# Patient Record
Sex: MALE | Race: BLACK OR AFRICAN AMERICAN | NOT HISPANIC OR LATINO | Employment: FULL TIME | URBAN - METROPOLITAN AREA
[De-identification: names, ages, dates, MRNs, and addresses within clinical notes are randomized per-mention and may not be internally consistent; named-entity substitution may affect disease eponyms.]

---

## 2019-04-11 ENCOUNTER — HOSPITAL ENCOUNTER (EMERGENCY)
Facility: HOSPITAL | Age: 29
Discharge: HOME | End: 2019-04-11
Attending: EMERGENCY MEDICINE

## 2019-04-11 VITALS
DIASTOLIC BLOOD PRESSURE: 88 MMHG | RESPIRATION RATE: 16 BRPM | OXYGEN SATURATION: 98 % | HEIGHT: 69 IN | WEIGHT: 125 LBS | SYSTOLIC BLOOD PRESSURE: 131 MMHG | HEART RATE: 74 BPM | BODY MASS INDEX: 18.51 KG/M2 | TEMPERATURE: 98 F

## 2019-04-11 DIAGNOSIS — R00.2 PALPITATIONS: Primary | ICD-10-CM

## 2019-04-11 DIAGNOSIS — R07.9 CHEST PAIN: ICD-10-CM

## 2019-04-11 LAB
ALBUMIN SERPL BCP-MCNC: 4.3 G/DL (ref 3.5–5.2)
ALP SERPL-CCNC: 54 U/L (ref 55–135)
ALT SERPL W/O P-5'-P-CCNC: 17 U/L (ref 10–44)
ANION GAP SERPL CALC-SCNC: 9 MMOL/L (ref 8–16)
AST SERPL-CCNC: 21 U/L (ref 10–40)
BASOPHILS # BLD AUTO: 0.01 K/UL (ref 0–0.2)
BASOPHILS NFR BLD: 0.2 % (ref 0–1.9)
BILIRUB SERPL-MCNC: 1.4 MG/DL (ref 0.1–1)
BUN SERPL-MCNC: 12 MG/DL (ref 6–20)
CALCIUM SERPL-MCNC: 9.5 MG/DL (ref 8.7–10.5)
CHLORIDE SERPL-SCNC: 107 MMOL/L (ref 95–110)
CO2 SERPL-SCNC: 24 MMOL/L (ref 23–29)
CREAT SERPL-MCNC: 1.2 MG/DL (ref 0.5–1.4)
DIFFERENTIAL METHOD: NORMAL
EOSINOPHIL # BLD AUTO: 0.1 K/UL (ref 0–0.5)
EOSINOPHIL NFR BLD: 1.2 % (ref 0–8)
ERYTHROCYTE [DISTWIDTH] IN BLOOD BY AUTOMATED COUNT: 12.7 % (ref 11.5–14.5)
EST. GFR  (AFRICAN AMERICAN): >60 ML/MIN/1.73 M^2
EST. GFR  (NON AFRICAN AMERICAN): >60 ML/MIN/1.73 M^2
GLUCOSE SERPL-MCNC: 75 MG/DL (ref 70–110)
HCT VFR BLD AUTO: 43.9 % (ref 40–54)
HGB BLD-MCNC: 15.3 G/DL (ref 14–18)
IMM GRANULOCYTES # BLD AUTO: 0.01 K/UL (ref 0–0.04)
IMM GRANULOCYTES NFR BLD AUTO: 0.2 % (ref 0–0.5)
LYMPHOCYTES # BLD AUTO: 1.4 K/UL (ref 1–4.8)
LYMPHOCYTES NFR BLD: 28.3 % (ref 18–48)
MCH RBC QN AUTO: 30.7 PG (ref 27–31)
MCHC RBC AUTO-ENTMCNC: 34.9 G/DL (ref 32–36)
MCV RBC AUTO: 88 FL (ref 82–98)
MONOCYTES # BLD AUTO: 0.5 K/UL (ref 0.3–1)
MONOCYTES NFR BLD: 9.8 % (ref 4–15)
NEUTROPHILS # BLD AUTO: 3 K/UL (ref 1.8–7.7)
NEUTROPHILS NFR BLD: 60.3 % (ref 38–73)
NRBC BLD-RTO: 0 /100 WBC
PLATELET # BLD AUTO: 194 K/UL (ref 150–350)
PMV BLD AUTO: 10 FL (ref 9.2–12.9)
POTASSIUM SERPL-SCNC: 4.4 MMOL/L (ref 3.5–5.1)
PROT SERPL-MCNC: 7 G/DL (ref 6–8.4)
RBC # BLD AUTO: 4.98 M/UL (ref 4.6–6.2)
SODIUM SERPL-SCNC: 140 MMOL/L (ref 136–145)
T4 FREE SERPL-MCNC: 0.9 NG/DL (ref 0.71–1.51)
TROPONIN I SERPL DL<=0.01 NG/ML-MCNC: <0.006 NG/ML (ref 0–0.03)
TSH SERPL DL<=0.005 MIU/L-ACNC: 0.28 UIU/ML (ref 0.4–4)
WBC # BLD AUTO: 4.91 K/UL (ref 3.9–12.7)

## 2019-04-11 PROCEDURE — 93010 EKG 12-LEAD: ICD-10-PCS | Mod: ,,, | Performed by: INTERNAL MEDICINE

## 2019-04-11 PROCEDURE — 99284 PR EMERGENCY DEPT VISIT,LEVEL IV: ICD-10-PCS | Mod: ,,, | Performed by: EMERGENCY MEDICINE

## 2019-04-11 PROCEDURE — 84484 ASSAY OF TROPONIN QUANT: CPT

## 2019-04-11 PROCEDURE — 85025 COMPLETE CBC W/AUTO DIFF WBC: CPT

## 2019-04-11 PROCEDURE — 93005 ELECTROCARDIOGRAM TRACING: CPT

## 2019-04-11 PROCEDURE — 25000003 PHARM REV CODE 250: Performed by: EMERGENCY MEDICINE

## 2019-04-11 PROCEDURE — 84439 ASSAY OF FREE THYROXINE: CPT

## 2019-04-11 PROCEDURE — 84443 ASSAY THYROID STIM HORMONE: CPT

## 2019-04-11 PROCEDURE — 99285 EMERGENCY DEPT VISIT HI MDM: CPT | Mod: 25

## 2019-04-11 PROCEDURE — 99284 EMERGENCY DEPT VISIT MOD MDM: CPT | Mod: ,,, | Performed by: EMERGENCY MEDICINE

## 2019-04-11 PROCEDURE — 96361 HYDRATE IV INFUSION ADD-ON: CPT

## 2019-04-11 PROCEDURE — 96360 HYDRATION IV INFUSION INIT: CPT

## 2019-04-11 PROCEDURE — 93010 ELECTROCARDIOGRAM REPORT: CPT | Mod: ,,, | Performed by: INTERNAL MEDICINE

## 2019-04-11 PROCEDURE — 80053 COMPREHEN METABOLIC PANEL: CPT

## 2019-04-11 RX ADMIN — SODIUM CHLORIDE 1000 ML: 0.9 INJECTION, SOLUTION INTRAVENOUS at 06:04

## 2019-04-11 NOTE — ED TRIAGE NOTES
Pt reports having on/off episodes of chest palpitations, SOB, dizziness and left arm tingling today. Denies symptoms at this time.

## 2019-04-11 NOTE — ED PROVIDER NOTES
Encounter Date: 4/11/2019    SCRIBE #1 NOTE: I, Charles Mobley, am scribing for, and in the presence of,  Dr. Cisneros. I have scribed the entire note.       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  States he felt SOB during episodes.      Time patient was seen by the provider: 5:53 PM      The patient is a 28 y.o. male with co-morbidities including: Asthma,  who presents to the ED with a complaint of palpitations. 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 Patient states his sx are worsened with ambulation. Patient reports that he has had anxiety in the past but has never experienced the arm numbness which prompted him to present to the ED. Patient smokes about a half pack a day. Patient denies recreational drug use or heavy drinking. Patient has been taking probiotic apple cider OTC. Patient endorses SOB, palpitations, arm numbness, and light headedness. Patient denies any associated runny nose, cough, melena, or increased frequency.        Review of patient's allergies indicates:   Allergen Reactions    Shellfish containing products      Past Medical History:   Diagnosis Date    Asthma      Past Surgical History:   Procedure Laterality Date    HAND SURGERY       History reviewed. No pertinent family history.  Social History     Tobacco Use    Smoking status: Current Some Day Smoker    Smokeless tobacco: Never Used   Substance Use Topics    Alcohol use: Yes     Comment: socially    Drug use: Never     Review of Systems   Constitutional: Negative for chills and fever.   HENT: Negative for rhinorrhea.    Eyes: Negative for visual disturbance.        Neg vision changes   Respiratory: Positive for shortness of breath. Negative for cough.    Cardiovascular: Positive for  palpitations. Negative for chest pain and leg swelling.   Gastrointestinal: Negative for abdominal pain, blood in stool, nausea and vomiting.        Neg changes in stool   Genitourinary:        Neg changes in urination   Musculoskeletal: Negative for arthralgias and joint swelling.   Skin: Negative for rash.   Allergic/Immunologic: Negative for immunocompromised state.   Neurological: Positive for light-headedness and numbness. Negative for headaches.   Hematological: Does not bruise/bleed easily.       Physical Exam     Initial Vitals [04/11/19 1627]   BP Pulse Resp Temp SpO2   (!) 145/93 92 16 98.4 °F (36.9 °C) 100 %      MAP       --         Physical Exam    Nursing note and vitals reviewed.  Constitutional: He appears well-developed and well-nourished. He is not diaphoretic. No distress.   HENT:   Head: Normocephalic and atraumatic.   Nose: Nose normal.   No lymphadenopathy. No thyromegaly.    Eyes: Conjunctivae and EOM are normal. Pupils are equal, round, and reactive to light.   Neck: Normal range of motion. Neck supple. No thyromegaly present.   Cardiovascular: Normal rate and regular rhythm.   No murmur heard.  Bilateral patellar pulses 2+.   Pulmonary/Chest: Breath sounds normal. No respiratory distress. He has no wheezes. He has no rhonchi. He has no rales. He exhibits no tenderness.   Abdominal: Soft. Bowel sounds are normal. He exhibits no distension. There is no tenderness.   Musculoskeletal: Normal range of motion. He exhibits no edema.   Neurological: He is alert and oriented to person, place, and time. He has normal strength.   Skin: Skin is warm and dry. No rash noted.   Psychiatric: He has a normal mood and affect. His behavior is normal. Thought content normal.         ED Course   Procedures  Labs Reviewed   COMPREHENSIVE METABOLIC PANEL - Abnormal; Notable for the following components:       Result Value    Total Bilirubin 1.4 (*)     Alkaline Phosphatase 54 (*)     All other components within  "normal limits   TSH - Abnormal; Notable for the following components:    TSH 0.275 (*)     All other components within normal limits   CBC W/ AUTO DIFFERENTIAL   TROPONIN I   T4, FREE     EKG Readings: (Independently Interpreted)   Initial Reading: No STEMI. Previous EKG: Compared with most recent EKG Rhythm: Normal Sinus Rhythm. Heart Rate: 84.   pr 148  qrs 88  qtc 411  NO PVCS       Imaging Results          X-Ray Chest PA And Lateral (Final result)  Result time 04/11/19 18:59:13    Final result by Williams Lau MD (04/11/19 18:59:13)                 Impression:      1. No acute cardiopulmonary process appreciated.    Electronically signed by resident: Peggy Ferrara  Date:    04/11/2019  Time:    18:39    Electronically signed by: Williams Lau  Date:    04/11/2019  Time:    18:59             Narrative:    EXAMINATION:  XR CHEST PA AND LATERAL    CLINICAL HISTORY:  Chest Pain    TECHNIQUE:  PA and lateral views of the chest were performed.    COMPARISON:  None    FINDINGS:  The mediastinal structures are midline.  The cardiac silhouette and pulmonary vasculature are normal.    The lung volumes are normal and symmetrical.  No consolidative opacities.  No pneumothorax or pleural fluid.    Osseous and soft tissue structures are within normal limits.                                 Medical Decision Making:   History:   Old Medical Records: I decided to obtain old medical records.  Initial Assessment:   28-year-old male with no significant past medical history presenting with palpitations today accompanied by some shortness of breath.  Patient is an occasional smoker, drinks an energy drink every 4 days, but has had very little sleep in recent weeks as he is working full-time and going to sleep full-time, also has been trying to stay healthy by drinking apple cider vinegar, and "probiotics "such as estefanía and blackberry drinks.  Patient does endorse history of anxiety and panic attacks with palpitations that are " similar, however he states that this was worse with exertion and had numbness radiate up to his left upper arm and left neck during 20 min duration palpations  Differential Diagnosis:   Anxiety/panic attack versus arrhythmia versus PVCs versus anemia versus thyroid disorder versus less likely ACS  Clinical Tests:   Lab Tests: Ordered and Reviewed  Radiological Study: Ordered and Reviewed  Medical Tests: Reviewed and Ordered  ED Management:  Will check for ACS, thyroid disorder, anemia.            Scribe Attestation:   Scribe #1: I performed the above scribed service and the documentation accurately describes the services I performed. I attest to the accuracy of the note.               Clinical Impression:       ICD-10-CM ICD-9-CM   1. Palpitations R00.2 785.1   2. Chest pain R07.9 786.50         Disposition:   Disposition: Discharged  Condition: Stable                        Zita Cisneros MD  04/22/19 0774

## 2019-04-11 NOTE — ED NOTES
Patient identifiers for Juan Grant 28 y.o. male checked and correct.  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  States he felt SOB during episodes.      Past Medical History:   Diagnosis Date    Asthma      Allergies reported:   Review of patient's allergies indicates:   Allergen Reactions    Shellfish containing products          LOC: Patient is awake, alert, and aware of environment with an appropriate affect. Patient is oriented x 3 and speaking appropriately.  APPEARANCE: Patient resting comfortably and in no acute distress. Patient is clean and well groomed, patient's clothing is properly fastened.  SKIN: The skin is warm and dry.   MUSKULOSKELETAL: Patient is moving all extremities well, no obvious deformities noted. Pulses intact.   RESPIRATORY: Airway is open and patent. Respirations are spontaneous and non-labored with normal effort and rate.   CARDIAC: Patient has a normal rate and rhythm. SR on EKG,No peripheral edema noted.

## 2019-04-12 NOTE — DISCHARGE INSTRUCTIONS
You were seen in the emergency department for palpitations.  Your TSH was low, but your free T4 was normal, and you should follow-up with a cardiologist or your PCP for further evaluation.  Drink plenty of clear fluids and get plenty of rest, and avoid any energy drinks, caffeine, or herbal supplements. Return to the emergency for worsening palpitations, chest pain, shortness of breath, or other concerning symptoms.

## 2019-08-12 ENCOUNTER — HOSPITAL ENCOUNTER (EMERGENCY)
Facility: OTHER | Age: 29
Discharge: LEFT BEFORE DISCHARGE | End: 2019-08-12
Attending: EMERGENCY MEDICINE

## 2019-08-12 VITALS
OXYGEN SATURATION: 99 % | SYSTOLIC BLOOD PRESSURE: 137 MMHG | HEART RATE: 93 BPM | HEIGHT: 69 IN | DIASTOLIC BLOOD PRESSURE: 86 MMHG | WEIGHT: 164.25 LBS | BODY MASS INDEX: 24.33 KG/M2 | TEMPERATURE: 99 F | RESPIRATION RATE: 18 BRPM

## 2019-08-12 DIAGNOSIS — R00.2 PALPITATIONS: ICD-10-CM

## 2019-08-12 PROCEDURE — 93010 EKG 12-LEAD: ICD-10-PCS | Mod: ,,, | Performed by: INTERNAL MEDICINE

## 2019-08-12 PROCEDURE — 99900041 HC LEFT WITHOUT BEING SEEN- EMERGENCY

## 2019-08-12 PROCEDURE — 93010 ELECTROCARDIOGRAM REPORT: CPT | Mod: ,,, | Performed by: INTERNAL MEDICINE

## 2019-08-12 PROCEDURE — 93005 ELECTROCARDIOGRAM TRACING: CPT

## 2019-08-12 NOTE — ED NOTES
Pt questioning how much longer. Informed pt that we were moving as fast as we could. Asked pt to sit in PIT for provider to come evaluate pt.

## 2023-02-20 ENCOUNTER — CLINICAL SUPPORT (OUTPATIENT)
Dept: SMOKING CESSATION | Facility: CLINIC | Age: 33
End: 2023-02-20
Payer: COMMERCIAL

## 2023-02-20 DIAGNOSIS — F17.200 NICOTINE DEPENDENCE: Primary | ICD-10-CM

## 2023-02-20 PROCEDURE — 99404 PREV MED CNSL INDIV APPRX 60: CPT | Mod: 95,,, | Performed by: GENERAL PRACTICE

## 2023-02-20 PROCEDURE — 99404 PR PREVENT COUNSEL,INDIV,60 MIN: ICD-10-PCS | Mod: 95,,, | Performed by: GENERAL PRACTICE

## 2023-02-20 RX ORDER — BUPROPION HYDROCHLORIDE 150 MG/1
150 TABLET, EXTENDED RELEASE ORAL 2 TIMES DAILY
Qty: 60 TABLET | Refills: 0 | Status: SHIPPED | OUTPATIENT
Start: 2023-02-20 | End: 2023-03-16 | Stop reason: SDUPTHER

## 2023-02-20 RX ORDER — IBUPROFEN 200 MG
1 TABLET ORAL DAILY
Qty: 14 PATCH | Refills: 0 | Status: SHIPPED | OUTPATIENT
Start: 2023-02-20 | End: 2023-03-02 | Stop reason: SDUPTHER

## 2023-02-20 NOTE — PROGRESS NOTES
The patient location is: [place], [place]  The chief complaint leading to consultation is: Nicotine dependance    Visit type: audiovisual    Face to Face time with patient: 26  54 minutes of total time spent on the encounter, which includes face to face time and non-face to face time preparing to see the patient (eg, review of tests), Obtaining and/or reviewing separately obtained history, Documenting clinical information in the electronic or other health record, Independently interpreting results (not separately reported) and communicating results to the patient/family/caregiver, or Care coordination (not separately reported).         Each patient to whom he or she provides medical services by telemedicine is:  (1) informed of the relationship between the physician and patient and the respective role of any other health care provider with respect to management of the patient; and (2) notified that he or she may decline to receive medical services by telemedicine and may withdraw from such care at any time.    Notes: Patient will be participating in weekly tobacco cessation meetings and will begin the prescribed tobacco cessation medication 21 mg patches and Wellbutrin SR 150mg. FTND of 7 indicates a high level of dependence to tobacco. NELLY-D of 31 is perceived as no mental distress/ depression.

## 2023-02-20 NOTE — Clinical Note
Patient will be participating in weekly tobacco cessation meetings and will begin the prescribed tobacco cessation medication 21 mg patches and Wellbutrin SR 150mg. FTND of 7 indicates a high level of dependence to tobacco. NELLY-D of 31 is perceived as no mental distress/ depression.

## 2023-02-27 ENCOUNTER — CLINICAL SUPPORT (OUTPATIENT)
Dept: SMOKING CESSATION | Facility: CLINIC | Age: 33
End: 2023-02-27
Payer: MEDICAID

## 2023-02-27 DIAGNOSIS — F17.200 NICOTINE DEPENDENCE: Primary | ICD-10-CM

## 2023-02-27 PROCEDURE — 99403 PREV MED CNSL INDIV APPRX 45: CPT | Mod: S$GLB,,, | Performed by: GENERAL PRACTICE

## 2023-02-27 PROCEDURE — 99403 PR PREVENT COUNSEL,INDIV,45 MIN: ICD-10-PCS | Mod: S$GLB,,, | Performed by: GENERAL PRACTICE

## 2023-02-27 NOTE — PROGRESS NOTES
Individual Follow-Up Form    2/27/2023    Quit Date: TBD    Clinical Status of Patient: Outpatient    Length of Service: 45 minutes    Continuing Medication: yes  Wellbutrin or Patches    Other Medications: none     Target Symptoms: Withdrawal and medication side effects. The following were rated moderate (3) to severe (4) on TCRS:  Moderate (3): none  Severe (4): none    Comments: Spoke with patient at length via phone regarding tobacco cessation progress. Patient was prescribed 21mg nicotine patches and Wellbutrin SR 150mg twice a day with no side effects at this time. Will refill in a week. Patient has decreased from 10 to 4 cigarettes a day. He is so excited about quitting smoking and the progress he has made in a week. I commended him on cutting down.  He has referred his mom and brother to the program. We discussed the benefits of quitting smoking, learned addiction model, cues/triggers, personal reasons for quitting, medications, and goals. Discussed ways to relax and de-stress. The patient will continue with therapy sessions and medication monitoring by CTTS. Prescribed medication management will be by physician.    Diagnosis: F17.200    Next Visit: 2 weeks

## 2023-02-27 NOTE — Clinical Note
Spoke with patient at length via phone regarding tobacco cessation progress. Patient was prescribed 21mg nicotine patches and Wellbutrin SR 150mg twice a day with no side effects at this time. Will refill in a week. Patient has decreased from 10 to 4 cigarettes a day. He is so excited about quitting smoking and the progress he has made in a week. I commended him on cutting down.  He has referred his mom and brother to the program. We discussed the benefits of quitting smoking, learned addiction model, cues/triggers, personal reasons for quitting, medications, and goals. Discussed ways to relax and de-stress. The patient will continue with therapy sessions and medication monitoring by CTTS. Prescribed medication management will be by physician.

## 2023-03-02 DIAGNOSIS — F17.200 NICOTINE DEPENDENCE: ICD-10-CM

## 2023-03-02 RX ORDER — IBUPROFEN 200 MG
1 TABLET ORAL DAILY
Qty: 28 PATCH | Refills: 0 | Status: SHIPPED | OUTPATIENT
Start: 2023-03-02 | End: 2023-03-30 | Stop reason: SDUPTHER

## 2023-03-16 ENCOUNTER — CLINICAL SUPPORT (OUTPATIENT)
Dept: SMOKING CESSATION | Facility: CLINIC | Age: 33
End: 2023-03-16
Payer: MEDICAID

## 2023-03-16 DIAGNOSIS — F17.200 NICOTINE DEPENDENCE: Primary | ICD-10-CM

## 2023-03-16 PROCEDURE — 99403 PREV MED CNSL INDIV APPRX 45: CPT | Mod: S$GLB,,, | Performed by: GENERAL PRACTICE

## 2023-03-16 PROCEDURE — 99403 PR PREVENT COUNSEL,INDIV,45 MIN: ICD-10-PCS | Mod: S$GLB,,, | Performed by: GENERAL PRACTICE

## 2023-03-16 RX ORDER — BUPROPION HYDROCHLORIDE 150 MG/1
150 TABLET, EXTENDED RELEASE ORAL 2 TIMES DAILY
Qty: 60 TABLET | Refills: 0 | Status: SHIPPED | OUTPATIENT
Start: 2023-03-16 | End: 2023-05-04 | Stop reason: SDUPTHER

## 2023-03-16 NOTE — Clinical Note
Spoke with patient at length via phone regarding tobacco cessation progress. Patient was prescribed 21mg nicotine patches and Wellbutrin SR 150mg twice a day with no side effects at this time. Refilled Wellbutrin. Patient has his last cigarette on 3/12/23. His quit date is 3/13/23. I commended him on quitting. Reviewed strategies, cues, and triggers. Introduced the negative impact of tobacco on health, the health advantages of discontinuing the use of tobacco, time line improved health changes after a quit, withdrawal issues to expect from nicotine and habit, and ways to achieve the goal of a quit. No cravings, just the habit and when he gets stress. We went through the proper way to do deep breathing. Discussed ways to relax and de-stress. dicussed rewarding himself with the money saved. 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 The patient will continue with therapy sessions and medication monitoring by CTTS. Prescribed medication management will be by physician.

## 2023-03-16 NOTE — PROGRESS NOTES
Individual Follow-Up Form    3/16/2023    Quit Date: 3/13/23    Clinical Status of Patient: Outpatient    Length of Service: 45 minutes    Continuing Medication: yes  Wellbutrin or Patches    Other Medications: none     Target Symptoms: Withdrawal and medication side effects. The following were  rated moderate (3) to severe (4) on TCRS:  Moderate (3): none  Severe (4): none    Comments: Spoke with patient at length via phone regarding tobacco cessation progress. Patient was prescribed 21mg nicotine patches and Wellbutrin SR 150mg twice a day with no side effects at this time. Refilled Wellbutrin. Patient has his last cigarette on 3/12/23. His quit date is 3/13/23. I commended him on quitting. Reviewed strategies, cues, and triggers. Introduced the negative impact of tobacco on health, the health advantages of discontinuing the use of tobacco, time line improved health changes after a quit, withdrawal issues to expect from nicotine and habit, and ways to achieve the goal of a quit. No cravings, just the habit and when he gets stress. We went through the proper way to do deep breathing. Discussed ways to relax and de-stress. dicussed rewarding himself with the money saved. 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 The patient will continue with therapy sessions and medication monitoring by CTTS. Prescribed medication management will be by physician.    Diagnosis: F17.200    Next Visit: 2 weeks

## 2023-03-30 ENCOUNTER — CLINICAL SUPPORT (OUTPATIENT)
Dept: SMOKING CESSATION | Facility: CLINIC | Age: 33
End: 2023-03-30
Payer: COMMERCIAL

## 2023-03-30 ENCOUNTER — TELEPHONE (OUTPATIENT)
Dept: SMOKING CESSATION | Facility: CLINIC | Age: 33
End: 2023-03-30

## 2023-03-30 DIAGNOSIS — F17.200 NICOTINE DEPENDENCE: Primary | ICD-10-CM

## 2023-03-30 PROCEDURE — 99403 PR PREVENT COUNSEL,INDIV,45 MIN: ICD-10-PCS | Mod: S$GLB,,, | Performed by: GENERAL PRACTICE

## 2023-03-30 PROCEDURE — 99403 PREV MED CNSL INDIV APPRX 45: CPT | Mod: S$GLB,,, | Performed by: GENERAL PRACTICE

## 2023-03-30 RX ORDER — IBUPROFEN 200 MG
1 TABLET ORAL DAILY
Qty: 28 PATCH | Refills: 0 | Status: SHIPPED | OUTPATIENT
Start: 2023-03-30 | End: 2023-05-04 | Stop reason: SDUPTHER

## 2023-03-30 NOTE — Clinical Note
Spoke with patient at length via phone regarding tobacco cessation progress. Patient was prescribed 21mg nicotine patches and Wellbutrin SR 150mg twice a day with no side effects at this time. Refilled patches. Patient says that it seem as if the medication was not working. 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 Encouraged him to set another quit date. Tentative quit date is Saturday 4/1/23. He will find a hobby for work instead of smoking and also not buy and to have incase of craving. He also plan to set up challenge with girlfriend to keep each other accountable. Reviewed strategies, cues, triggers and ways to achieve the goal of a quit. The patient will continue with therapy sessions and medication monitoring by CTTS. Prescribed medication management will be by physician.

## 2023-03-30 NOTE — TELEPHONE ENCOUNTER
Attempted to follow up with patient regarding tobacco cessation progress. Patient did not answer, phone continued to ring.

## 2023-03-30 NOTE — PROGRESS NOTES
Individual Follow-Up Form    3/30/2023    Quit Date: TBD    Clinical Status of Patient: Outpatient    Length of Service: 45 minutes    Continuing Medication: yes  Wellbutrin or Patches    Other Medications: none     Target Symptoms: Withdrawal and medication side effects. The following were rated moderate (3) to severe (4) on TCRS:  Moderate (3): none  Severe (4): none    Comments: Spoke with patient at length via phone regarding tobacco cessation progress. Patient was prescribed 21mg nicotine patches and Wellbutrin SR 150mg twice a day with no side effects at this time. Refilled patches. Patient says that it seem as if the medication was not working. 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 Encouraged him to set another quit date. Tentative quit date is Saturday 4/1/23. He will find a hobby for work instead of smoking and also not buy and to have incase of craving. He also plan to set up challenge with girlfriend to keep each other accountable. Reviewed strategies, cues, triggers and ways to achieve the goal of a quit. The patient will continue with therapy sessions and medication monitoring by CTTS. Prescribed medication management will be by physician.    Diagnosis: F17.200    Next Visit: 2 weeks

## 2023-04-13 ENCOUNTER — CLINICAL SUPPORT (OUTPATIENT)
Dept: SMOKING CESSATION | Facility: CLINIC | Age: 33
End: 2023-04-13
Payer: COMMERCIAL

## 2023-04-13 DIAGNOSIS — F17.200 NICOTINE DEPENDENCE: Primary | ICD-10-CM

## 2023-04-13 PROCEDURE — 99403 PR PREVENT COUNSEL,INDIV,45 MIN: ICD-10-PCS | Mod: S$GLB,,, | Performed by: GENERAL PRACTICE

## 2023-04-13 PROCEDURE — 99403 PREV MED CNSL INDIV APPRX 45: CPT | Mod: S$GLB,,, | Performed by: GENERAL PRACTICE

## 2023-04-13 RX ORDER — DM/P-EPHED/ACETAMINOPH/DOXYLAM 30-7.5/3
2 LIQUID (ML) ORAL
Qty: 72 LOZENGE | Refills: 0 | Status: SHIPPED | OUTPATIENT
Start: 2023-04-13 | End: 2023-05-04 | Stop reason: SDUPTHER

## 2023-04-13 NOTE — PROGRESS NOTES
Individual Follow-Up Form    4/13/2023    Quit Date: 4/8/23    Clinical Status of Patient: Outpatient    Length of Service: 45 minutes    Continuing Medication: yes  Wellbutrin or Patches    Other Medications: ordered 2mg lozenges     Target Symptoms: Withdrawal and medication side effects. The following were rated moderate (3) to severe (4) on TCRS:  Moderate (3): none  Severe (4): none    Comments: Spoke with patient at length via phone regarding tobacco cessation progress. Patient remains on the prescribed medication of 21mg nicotine patches and Wellbutrin SR 150mg twice a day with no side effects at this time. No refills needed. Ordered 2mg lozenges.  Patient says that he has cravings at night due to co-workers smoke. Patient had one slip up and took a couple of puffs off a cigarette at work. Patient's new quit date is Saturday 4/8/23. Him and his girlfriend are doing different things to keep each other accountable. Reviewed strategies, cues, triggers and ways to achieve the goal of a quit. The patient will continue with therapy sessions and medication monitoring by CTTS. Prescribed medication management will be by physician. Phone visit was with his significant other that is also in the program.    Diagnosis: F17.200    Next Visit: 2 weeks

## 2023-04-13 NOTE — Clinical Note
Spoke with patient at length via phone regarding tobacco cessation progress. Patient remains on the prescribed medication of 21mg nicotine patches and Wellbutrin SR 150mg twice a day with no side effects at this time. No refills needed. Ordered 2mg lozenges.  Patient says that he has cravings at night due to co-workers smoke. Patient had one slip up and took a couple of puffs off a cigarette at work. Patient's new quit date is Saturday 4/8/23. Him and his girlfriend are doing different things to keep each other accountable. Reviewed strategies, cues, triggers and ways to achieve the goal of a quit. The patient will continue with therapy sessions and medication monitoring by CTTS. Prescribed medication management will be by physician. Phone visit was with his significant other that is also in the program.

## 2023-05-04 ENCOUNTER — CLINICAL SUPPORT (OUTPATIENT)
Dept: SMOKING CESSATION | Facility: CLINIC | Age: 33
End: 2023-05-04
Payer: MEDICAID

## 2023-05-04 DIAGNOSIS — F17.200 NICOTINE DEPENDENCE: ICD-10-CM

## 2023-05-04 PROCEDURE — 90853 GROUP PSYCHOTHERAPY: CPT | Mod: S$GLB,,, | Performed by: GENERAL PRACTICE

## 2023-05-04 PROCEDURE — 90853 PR GROUP PSYCHOTHERAPY: ICD-10-PCS | Mod: S$GLB,,, | Performed by: GENERAL PRACTICE

## 2023-05-04 RX ORDER — BUPROPION HYDROCHLORIDE 150 MG/1
150 TABLET, EXTENDED RELEASE ORAL 2 TIMES DAILY
Qty: 60 TABLET | Refills: 0 | Status: SHIPPED | OUTPATIENT
Start: 2023-05-08 | End: 2023-06-08 | Stop reason: SDUPTHER

## 2023-05-04 RX ORDER — IBUPROFEN 200 MG
1 TABLET ORAL DAILY
Qty: 28 PATCH | Refills: 0 | Status: SHIPPED | OUTPATIENT
Start: 2023-05-04 | End: 2023-06-08 | Stop reason: SDUPTHER

## 2023-05-04 RX ORDER — DM/P-EPHED/ACETAMINOPH/DOXYLAM 30-7.5/3
2 LIQUID (ML) ORAL
Qty: 144 LOZENGE | Refills: 0 | Status: SHIPPED | OUTPATIENT
Start: 2023-05-04

## 2023-05-04 NOTE — PROGRESS NOTES
Individual Follow-Up Form    5/4/2023    Quit Date: 4/8/23    Clinical Status of Patient: Outpatient    Length of Service: 60 minutes    Continuing Medication: yes  Wellbutrin, Patches, or Nicotine Lozenges    Other Medications: none     Target Symptoms: Withdrawal and medication side effects. The following were  rated moderate (3) to severe (4) on TCRS:  Moderate (3): none  Severe (4): none    Comments: Spoke with patient and his significant other at length via phone regarding tobacco cessation progress. Patient remains on prescribed tobacco cessation medication regimen of Wellbutrin SR 150mg twice daily, 21mg nicotine patch and 2mg lozenges (PRN) without any negative side effects at this time. Refilled Wellbutrin, patches and lozenges.. Patient remains quit as of 4/8/23 though he slipped and took a puff off a cigarette from a co-worker. 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 I commended him on remaining quit. Patient stated that he is doing well and only having cravings or urges at work. I advised him to keep his lozenges available to use instead. Patient stated no issues with weight gain nor sleep disturbance. Reviewed strategies, cues, triggers, ways to not slip and relapse and deal with stressors at work. The patient will continue with therapy sessions and medication monitoring by CTTS. Prescribed medication management will be by physician. The patient denies any abnormal behavioral or mental changes at this time.    Diagnosis: F17.200    Next Visit: 2 weeks

## 2023-05-04 NOTE — Clinical Note
Spoke with patient and his significant other at length via phone regarding tobacco cessation progress. Patient remains on prescribed tobacco cessation medication regimen of Wellbutrin SR 150mg twice daily, 21mg nicotine patch and 2mg lozenges (PRN) without any negative side effects at this time. Refilled Wellbutrin, patches and lozenges.. Patient remains quit as of 4/8/23 though he slipped and took a puff off a cigarette from a co-worker. 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 I commended him on remaining quit. Patient stated that he is doing well and only having cravings or urges at work. I advised him to keep his lozenges available to use instead. Patient stated no issues with weight gain nor sleep disturbance. Reviewed strategies, cues, triggers, ways to not slip and relapse and deal with stressors at work. The patient will continue with therapy sessions and medication monitoring by CTTS. Prescribed medication management will be by physician. The patient denies any abnormal behavioral or mental changes at this time.

## 2023-05-17 ENCOUNTER — TELEPHONE (OUTPATIENT)
Dept: SMOKING CESSATION | Facility: CLINIC | Age: 33
End: 2023-05-17
Payer: MEDICAID

## 2023-05-25 ENCOUNTER — CLINICAL SUPPORT (OUTPATIENT)
Dept: SMOKING CESSATION | Facility: CLINIC | Age: 33
End: 2023-05-25
Payer: MEDICAID

## 2023-05-25 DIAGNOSIS — F17.200 NICOTINE DEPENDENCE: Primary | ICD-10-CM

## 2023-05-25 PROCEDURE — 90853 PR GROUP PSYCHOTHERAPY: ICD-10-PCS | Mod: S$GLB,,, | Performed by: GENERAL PRACTICE

## 2023-05-25 PROCEDURE — 90853 GROUP PSYCHOTHERAPY: CPT | Mod: S$GLB,,, | Performed by: GENERAL PRACTICE

## 2023-05-25 NOTE — Clinical Note
Spoke with patient and his significant other via phone. He remains on prescribed tobacco cessation medication regimen of Wellbutrin SR 150mg twice daily, 21mg nicotine patch and 2mg lozenges (PRN) without any negative side effects at this time. Patient says that he has days where his anxiety level is high and he has mood swings. No refills needed. Patient remains quit as of 4/8/23 with no slips. Trigger remains co-workers. I commended him on remaining quit. Patient stated that he is doing well and only having cravings or urges at work. I advised him to keep his lozenges available to use instead. I advised him to reach out to his PCP. Patient stated no issues with weight gain nor sleep disturbance. Reviewed strategies, cues, triggers, ways to not slip and relapse and deal with stressors at work. The patient will continue with therapy sessions and medication monitoring by CTTS. Prescribed medication management will be by physician. The patient denies any abnormal behavioral or mental changes at this time.

## 2023-05-25 NOTE — PROGRESS NOTES
Individual Follow-Up Form    5/25/2023    Quit Date: 4/8/23    Clinical Status of Patient: Outpatient    Length of Service: 45 minutes    Continuing Medication: yes  Wellbutrin, Patches, or Nicotine Lozenges    Other Medications: none     Target Symptoms: Withdrawal and medication side effects. The following were rated moderate (3) to severe (4) on TCRS:  Moderate (3): none  Severe (4): none    Comments: Spoke with patient and his significant other at length via phone regarding tobacco cessation progress. Patient remains on prescribed tobacco cessation medication regimen of Wellbutrin SR 150mg twice daily, 21mg nicotine patch and 2mg lozenges (PRN) without any negative side effects at this time. Patient says that he has days where his anxiety level is high and he has mood swings. No refills needed. Patient remains quit as of 4/8/23 with no slips. Trigger remains co-workers. I commended him on remaining quit. Patient stated that he is doing well and only having cravings or urges at work. I advised him to keep his lozenges available to use instead. I advised him to reach out to his PCP. Patient stated no issues with weight gain nor sleep disturbance. Reviewed strategies, cues, triggers, ways to not slip and relapse and deal with stressors at work. The patient will continue with therapy sessions and medication monitoring by CTTS. Prescribed medication management will be by physician. The patient denies any abnormal behavioral or mental changes at this time.    Diagnosis: F17.200    Next Visit: 2 weeks

## 2023-06-08 ENCOUNTER — CLINICAL SUPPORT (OUTPATIENT)
Dept: SMOKING CESSATION | Facility: CLINIC | Age: 33
End: 2023-06-08

## 2023-06-08 DIAGNOSIS — F17.200 NICOTINE DEPENDENCE: Primary | ICD-10-CM

## 2023-06-08 PROCEDURE — 90853 PR GROUP PSYCHOTHERAPY: ICD-10-PCS | Mod: S$GLB,,, | Performed by: GENERAL PRACTICE

## 2023-06-08 PROCEDURE — 90853 GROUP PSYCHOTHERAPY: CPT | Mod: S$GLB,,, | Performed by: GENERAL PRACTICE

## 2023-06-08 RX ORDER — BUPROPION HYDROCHLORIDE 150 MG/1
150 TABLET, EXTENDED RELEASE ORAL 2 TIMES DAILY
Qty: 60 TABLET | Refills: 1 | Status: SHIPPED | OUTPATIENT
Start: 2023-06-08 | End: 2023-08-07

## 2023-06-08 RX ORDER — IBUPROFEN 200 MG
1 TABLET ORAL DAILY
Qty: 28 PATCH | Refills: 1 | Status: SHIPPED | OUTPATIENT
Start: 2023-06-08

## 2023-06-08 NOTE — PROGRESS NOTES
Individual Follow-Up Form    6/8/2023    Quit Date: 4/8/23    Clinical Status of Patient: Outpatient    Length of Service: 30 minutes    Continuing Medication: yes  Wellbutrin, Patches, or Nicotine Lozenges    Other Medications: none     Target Symptoms: Withdrawal and medication side effects. The following were  rated moderate (3) to severe (4) on TCRS:  Moderate (3): none  Severe (4): none    Comments: Spoke with patient at length via phone regarding tobacco cessation progress. Patient remains on prescribed tobacco cessation medication regimen of 21 mg patch and Wellbutrin  mg twice a day without any negative side effects at this time. Refilled patches and Wellbutrin. Patient says he remains quit as of 4/8/23 but slipped and had one cigarette on 6/1/23 because of them moving and it was so stressful. He stated that that one cigarette made him nauseated and he has not had a desire to smoke another. I commended he on getting back on track. No other stressor or cravings at this time. Reviewed strategies, habitual behavior, high risks situations, understanding urges and cravings, stress and relaxation with open discussion and additional interventions, Introduced lapses, relapses, understanding them and analyzing the situation of a lapse, conflict issues that may be linked to a lapse. The patient will continue with  therapy sessions and medication monitoring by CTTS. Prescribed medication management will be by physician. The patient denies any abnormal behavioral or mental changes at this time.    Diagnosis: F17.200    Next Visit: 2 weeks

## 2023-06-08 NOTE — Clinical Note
Patient remains on prescribed tobacco cessation medication regimen of 21 mg patch and Wellbutrin  mg twice a day without any negative side effects at this time. Refilled patches and Wellbutrin. Patient says he remains quit as of 4/8/23 but slipped and had one cigarette on 6/1/23 due moving and it was so stressful. He stated that that one cigarette made him nauseated and he has not had a desire to smoke another. I commended he on getting back on track. No other stressor or cravings at this time. Reviewed strategies, habitual behavior, high risks situations, understanding urges and cravings, stress and relaxation with open discussion and additional interventions, Introduced lapses, relapses, understanding them and analyzing the situation of a lapse, conflict issues that may be linked to a lapse. The patient will continue with  therapy sessions and medication monitoring by CTTS. Prescribed medication management will be by physician. The patient denies any abnormal behavioral or mental changes at this time.

## 2023-08-07 ENCOUNTER — TELEPHONE (OUTPATIENT)
Dept: SMOKING CESSATION | Facility: CLINIC | Age: 33
End: 2023-08-07
Payer: MEDICAID

## 2023-08-07 NOTE — TELEPHONE ENCOUNTER
Attempt to contact patient for a scheduled telephone follow up in regards to his smoking cessation status. No answer. No voicemail available to return contact information.

## 2023-08-24 ENCOUNTER — CLINICAL SUPPORT (OUTPATIENT)
Dept: SMOKING CESSATION | Facility: CLINIC | Age: 33
End: 2023-08-24

## 2023-08-24 DIAGNOSIS — F17.200 NICOTINE DEPENDENCE: Primary | ICD-10-CM

## 2023-08-24 PROCEDURE — 99407 BEHAV CHNG SMOKING > 10 MIN: CPT | Mod: S$GLB,,,

## 2023-08-24 PROCEDURE — 99407 PR TOBACCO USE CESSATION INTENSIVE >10 MINUTES: ICD-10-PCS | Mod: S$GLB,,,

## 2023-08-24 NOTE — PROGRESS NOTES
Spoke with patient today in regard to smoking cessation progress for 6 month phone follow up on Quit 1. Patient is tobacco free at this time but stated that he continues to work on his quit with cessation medication. Commended patient on their success thus far.  Patient currently enrolled in program for Quit 1 and has an appointment scheduled with his CTTS.   Informed patient of benefit period and contact information if any further help or support is needed. Will complete smart form for 3/6 month follow up on Quit attempt # 1.   58

## 2023-08-29 ENCOUNTER — CLINICAL SUPPORT (OUTPATIENT)
Dept: SMOKING CESSATION | Facility: CLINIC | Age: 33
End: 2023-08-29

## 2023-08-29 DIAGNOSIS — F17.200 NICOTINE DEPENDENCE: Primary | ICD-10-CM

## 2023-08-29 PROCEDURE — 99402 PREV MED CNSL INDIV APPRX 30: CPT | Mod: 95,,, | Performed by: GENERAL PRACTICE

## 2023-08-29 PROCEDURE — 99402 PR PREVENT COUNSEL,INDIV,30 MIN: ICD-10-PCS | Mod: 95,,, | Performed by: GENERAL PRACTICE

## 2023-08-29 NOTE — Clinical Note
Spoke with patient at length via phone regarding tobacco cessation progress. Patient remains on prescribed tobacco cessation medication regimen of 21 mg patch and Wellbutrin  mg once a day and 2mg nicotine lozenges without any negative side effects at this time. No refill needed. Patient says he remains quit as of 4/8/23. No other stressor or cravings at this time. Reviewed strategies, habitual behavior, high risks situations, understanding urges and cravings, stress and relaxation with open discussion and additional interventions, Introduced lapses, relapses, understanding them and analyzing the situation of a lapse, conflict issues that may be linked to a lapse. The patient will continue with  therapy sessions and medication monitoring by CTTS. Prescribed medication management will be by physician. The patient denies any abnormal behavioral or mental changes at this time.

## 2023-08-29 NOTE — PROGRESS NOTES
Individual Follow-Up Form    8/29/2023    Quit Date: 4/8/23    Clinical Status of Patient: Outpatient    Length of Service: 30 minutes    Continuing Medication: yes  Wellbutrin, Patches, or Nicotine Lozenges    Other Medications: none     Target Symptoms: Withdrawal and medication side effects. The following were rated moderate (3) to severe (4) on TCRS:  Moderate (3): none  Severe (4): none    Comments: Spoke with patient at length via phone regarding tobacco cessation progress. Patient remains on prescribed tobacco cessation medication regimen of 21 mg patch and Wellbutrin  mg once a day and 2mg nicotine lozenges without any negative side effects at this time. No refill needed. Patient says he remains quit as of 4/8/23. No other stressor or cravings at this time. Reviewed strategies, habitual behavior, high risks situations, understanding urges and cravings, stress and relaxation with open discussion and additional interventions, Introduced lapses, relapses, understanding them and analyzing the situation of a lapse, conflict issues that may be linked to a lapse. The patient will continue with therapy sessions and medication monitoring by CTTS. Prescribed medication management will be by physician. The patient denies any abnormal behavioral or mental changes at this time.    Diagnosis: F17.200    Next Visit: 2 weeks

## 2024-02-08 ENCOUNTER — CLINICAL SUPPORT (OUTPATIENT)
Dept: SMOKING CESSATION | Facility: CLINIC | Age: 34
End: 2024-02-08

## 2024-02-08 DIAGNOSIS — F17.200 NICOTINE DEPENDENCE: Primary | ICD-10-CM

## 2024-02-08 NOTE — PROGRESS NOTES
Spoke with patient today in regard to smoking cessation progress 12 month telephone follow up, he states that he is tobacco free.  Commended patient on the accomplishments thus far.  Informed patient of benefit period, future follow up, and contact information if any further help or support is needed.  Will complete smart form for 12 month follow up on Quit attempt #1 and resolve episode.